# Patient Record
Sex: FEMALE | Race: OTHER | Employment: UNEMPLOYED | URBAN - NONMETROPOLITAN AREA
[De-identification: names, ages, dates, MRNs, and addresses within clinical notes are randomized per-mention and may not be internally consistent; named-entity substitution may affect disease eponyms.]

---
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
## 2024-01-22 ENCOUNTER — OFFICE VISIT (OUTPATIENT)
Dept: FAMILY MEDICINE CLINIC | Age: 19
End: 2024-01-22
Payer: MEDICAID

## 2024-01-22 VITALS
WEIGHT: 120.6 LBS | RESPIRATION RATE: 16 BRPM | SYSTOLIC BLOOD PRESSURE: 100 MMHG | OXYGEN SATURATION: 98 % | TEMPERATURE: 98.4 F | BODY MASS INDEX: 23.68 KG/M2 | HEIGHT: 60 IN | DIASTOLIC BLOOD PRESSURE: 60 MMHG | HEART RATE: 88 BPM

## 2024-01-22 DIAGNOSIS — R40.0 DAYTIME SLEEPINESS: ICD-10-CM

## 2024-01-22 DIAGNOSIS — J35.2 ENLARGED ADENOIDS: ICD-10-CM

## 2024-01-22 DIAGNOSIS — R41.3 MEMORY DIFFICULTY: Primary | ICD-10-CM

## 2024-01-22 PROCEDURE — 99214 OFFICE O/P EST MOD 30 MIN: CPT | Performed by: STUDENT IN AN ORGANIZED HEALTH CARE EDUCATION/TRAINING PROGRAM

## 2024-01-22 ASSESSMENT — PATIENT HEALTH QUESTIONNAIRE - PHQ9
2. FEELING DOWN, DEPRESSED OR HOPELESS: 0
1. LITTLE INTEREST OR PLEASURE IN DOING THINGS: 0
SUM OF ALL RESPONSES TO PHQ QUESTIONS 1-9: 0
SUM OF ALL RESPONSES TO PHQ9 QUESTIONS 1 & 2: 0
SUM OF ALL RESPONSES TO PHQ QUESTIONS 1-9: 0

## 2024-01-22 ASSESSMENT — ENCOUNTER SYMPTOMS
CONSTIPATION: 0
SHORTNESS OF BREATH: 0
COUGH: 0
NAUSEA: 0
DIARRHEA: 0
VOMITING: 0
BACK PAIN: 0
ABDOMINAL PAIN: 0

## 2024-01-22 NOTE — PROGRESS NOTES
Form put into Dr Desir Flax folder to complete S: 18 y.o. female with   Chief Complaint   Patient presents with    Memory Loss     Has been the same since last visit, forgets small things and feels \"scrambled\"       HPI: please see resident note for HPI and ROS.    BP Readings from Last 3 Encounters:   01/22/24 100/60   12/27/23 106/74   11/30/23 110/76     Wt Readings from Last 3 Encounters:   01/22/24 54.7 kg (120 lb 9.6 oz) (35 %, Z= -0.38)*   11/30/23 55.4 kg (122 lb 3.2 oz) (39 %, Z= -0.28)*   10/19/23 56.6 kg (124 lb 12.8 oz) (44 %, Z= -0.14)*     * Growth percentiles are based on [facility] (Girls, 2-20 Years) data.       O: VS:  height is 1.524 m (5') and weight is 54.7 kg (120 lb 9.6 oz). Her oral temperature is 98.4 °F (36.9 °C). Her blood pressure is 100/60 and her pulse is 88. Her respiration is 16 and oxygen saturation is 98%.   AAO/NAD, appropriate affect for mood  CV:  RRR, no murmur  Resp: CTAB     Diagnosis Orders   1. Memory difficulty  [facility] [place]      2. Daytime sleepiness  [facility] [place]      3. Enlarged adenoids  [facility]          Plan:  Please refer to resident note for full plan.    18-year-old female presents the office to follow-up on memory deficit.  Patient had an ASD repaired approximately 9 months ago and since getting out of anesthesia she has had long-term memory deficits resulting in forgetting things, forgetting that she is spending money, messing up on details.  Laboratory evaluation per chart review has been done which was all normal.  Patient was referred to neuropsych testing which is not till September 2024, neurology is in May 2024.  She will plan to follow-up with specialist as scheduled.  MRI was completed and showed enlarged adenoids but otherwise no concerns.  There is concern that patient is probably not sleeping well and possibly snoring with daytime somnolence.  Will plan for sleep study while waiting specialist input.  MoCA was also completed today in the office.  Plan

## 2024-01-22 NOTE — PROGRESS NOTES
Isabella Galeano is a 18 y.o. female who presents today for:  Chief Complaint   Patient presents with    Memory Loss     Has been the same since last visit, forgets small things and feels \"scrambled\"         HPI:   Isabella Galeano is 18 y.o. who presents today for memory follow-up memory difficulties.  Patient states she has had no changes since last visit.  She continues to have difficult time keeping detailed straight when she is having conversations.  She will mixup location, people who are involved in the conversation, and many significant details.  She was previously referred for neuropsych testing, appointment scheduled for 9/2024.  She was referred to neurology, has an appointment with Dr. Croft 5/2024.  Brain MRI was completed, which was overall normal.  It did note mildly enlarged adenoids.  On further discussion, patient states that she never wakes up feeling refreshed in the morning.  She has no known history of snoring or apneic episodes.  Feels that she has some ongoing daytime fatigue, she has with past several years.  She typically gets at least 8 hours of sleep nightly.  She will feel like she needs naps during the day.  This has not changed recently over the past couple months.      Objective:     Vitals:    01/22/24 1314   BP: 100/60   Site: Right Upper Arm   Position: Sitting   Cuff Size: Medium Adult   Pulse: 88   Resp: 16   Temp: 98.4 °F (36.9 °C)   TempSrc: Oral   SpO2: 98%   Weight: 54.7 kg (120 lb 9.6 oz)   Height: 1.524 m (5')       Wt Readings from Last 3 Encounters:   01/22/24 54.7 kg (120 lb 9.6 oz) (35 %, Z= -0.38)*   11/30/23 55.4 kg (122 lb 3.2 oz) (39 %, Z= -0.28)*   10/19/23 56.6 kg (124 lb 12.8 oz) (44 %, Z= -0.14)*     * Growth percentiles are based on CDC (Girls, 2-20 Years) data.       BP Readings from Last 3 Encounters:   01/22/24 100/60   12/27/23 106/74   11/30/23 110/76       Lab Results   Component Value Date    WBC 6.4 10/23/2023    HGB 14.4 10/23/2023    HCT 44.5

## 2024-01-24 ENCOUNTER — OFFICE VISIT (OUTPATIENT)
Dept: PULMONOLOGY | Age: 19
End: 2024-01-24
Payer: MEDICAID

## 2024-01-24 VITALS
DIASTOLIC BLOOD PRESSURE: 70 MMHG | OXYGEN SATURATION: 98 % | SYSTOLIC BLOOD PRESSURE: 110 MMHG | BODY MASS INDEX: 23.75 KG/M2 | TEMPERATURE: 98 F | HEART RATE: 81 BPM | WEIGHT: 121 LBS | HEIGHT: 60 IN

## 2024-01-24 DIAGNOSIS — G47.8 SLEEP TALKING: ICD-10-CM

## 2024-01-24 DIAGNOSIS — J30.9 ALLERGIC RHINITIS, UNSPECIFIED SEASONALITY, UNSPECIFIED TRIGGER: Primary | ICD-10-CM

## 2024-01-24 DIAGNOSIS — F51.3 SLEEP WALKING: ICD-10-CM

## 2024-01-24 PROCEDURE — 99204 OFFICE O/P NEW MOD 45 MIN: CPT | Performed by: INTERNAL MEDICINE

## 2024-01-24 RX ORDER — FLUTICASONE PROPIONATE 50 MCG
1 SPRAY, SUSPENSION (ML) NASAL 2 TIMES DAILY
Qty: 16 G | Refills: 11 | Status: SHIPPED | OUTPATIENT
Start: 2024-01-24

## 2024-01-24 NOTE — PROGRESS NOTES
Chief Complaint: New sleep consult, no prior studies    Mallampati airway Class:3  Neck Circumference:. 13.5Inches    Herkimer sleepiness score 1/24/24: 3  Sleep apnea quality of life questionnaire:.69

## 2024-01-24 NOTE — PATIENT INSTRUCTIONS
Recommendations/Plan:  - Schedule patient for nocturnal polysomnogram (Sleep study) at [facility] sleep lab with pediatric montage along with end tidal Co2 ( Carbon dioxide) monitoring.  Patient also need to be monitored with a parasomnia montage due to her history of sleep talking patient to follow with my clinic at [facility] slee clinic 1week after sleep study.  - Start patient on Flonase 50mcg/INH 2sprays each nostril daily. She  was informed about adverse effects of Flonase. She was demonstrated in my office how to use Flonase. She verbalizes understanding.  -Patient educated about sleep walking disorder and advised to practice necessary safety precautions she need to practice. She verbalizes understanding.  -I had a discussion with patient regarding avialable treatment options for her sleep disorder breathing including but not limited to CPAP titration in the sleep lab Vs.Dental appliance placement with referral to a local dentist Vs other available surgical options including Uvulopalatopharyngoplasty, maxillomandibular ostomy,Inspire device placement and tracheostomy as last option. At the end of discussion, she is not decided on her treatment if she found to have obstructive sleep apnea at this time.  -We will see Isabella Galeano back in 1week after the sleep study to go over the sleep study results and further management options.  -She was educated to practice good sleep hygiene practices. She was provided with a good sleep hygiene hand out.  -Isabella was advised to make earlier appointment with my clinic if she develops any worsening of sleep symptoms. She verbalizes understanding.  -Isabella was advised to not to drive any motor vehicles or operate heavy equipment until her sleep symptoms are under good control.Isabella Galeano verbalizes understanding.  - Isabella Galeano was educated about my impression and plan. She verbalizes understanding.

## 2024-01-24 NOTE — PROGRESS NOTES
[facility]  Sleep Medicine Clinic initial consultation note    Isabella Galeano                                                Chief complaint: Isabella Galeano is a 18 y.o.oldfemale came for further evaluation regarding her decreased memory and ?sleep apnea  with referral from Yumi Ceja MD. she underwent MRI scan of the brain with and without contrast as a part of evaluation for her decreased memory on 26 December 2023.  She was found to have enlarged adenoids on her MRI scan of the brain.  She was sent to my clinic for further evaluation regarding sleep apnea.      [place]:    Sleep/Wake schedule:  Usual time to go to bed during the work/regular day of week: 12 midnight or 1 AM  Usual time to wake up during the work//regular day of week: 10 AM to 11 AM  Over the weekends her sleep schedule:  [x]phase delayed. She feels the same on the weekends despite sleeping long time.  She usually falls a sleep in less than: 10 minutes to 1 hour  She takes naps: No.       Sleep Hygiene:  Is the temperature and evironment in her bed room is acceptable to her: Yes.   She watches Television in her bed room: No.   She read books, study, pay bills etc in the bed: Yes.  She works with her smart phone and read books on her smart phone before going to sleep  Frequency She wake up during night/sleep: 0    Do you drink coffee: No.       Do you drink caffeinated beverages i.e sodas: No.   Do you drink tea: Yes.  She drinks 3 to 4 glasses/mug of tea per week.  Do you drink alcoholic beverages: No.    History of recreational drug use: No    Social History     Tobacco Use    Smoking status: Never     Passive exposure: Never    Smokeless tobacco: Never   Vaping Use    Vaping Use: Never used   Substance Use Topics    Alcohol use: Never    Drug use: Never       Sleep apnea symptoms:  Noticed to have loud snoring:No  Witnessed apneas during sleep noticed: No  History of choking and gasping

## 2024-02-15 ENCOUNTER — TELEPHONE (OUTPATIENT)
Dept: SLEEP CENTER | Age: 19
End: 2024-02-15

## 2024-02-15 NOTE — TELEPHONE ENCOUNTER
Dr. Lopez, please advise.    Thank you,  Missy Haney below has denied:  Patient name: Isabella Galeano   : 2005  DOS :2024  CPT :[id number]  Insurance:   [employer] MEDICAID  Reason for denial:   name & ref# Type of Review 83495-0 - Medical Letter Rationale 89820-5 - You may stop breathing in your sleep (apnea). You snore. You are tired during the day. We do not see that you have a bad heart (Congestive Heart Failure-CHF) or lung (Chronic Obstructive Pulmonary Disease (COPD) problems. We do not see that you had a stroke. We do not see that you have other problems (complex sleep disorder, suspected (eg narcolepsy, cataplexy, periodic limb movement disorder). We do not see that you are on pain medicine (narcotic) that cannot be stopped. We do not see that you cannot do this test at home. You have the right to appeal this decision. Please see enclosed notice of your rights. Please call your doctor with any questions. We made our decision using the guideline: [facility] 27th Edition, Polysomnography (PSG) Sleep Center ACG: A-0145 (AC).     Case/Tracking/Auth/Ref # [id number]  Date of Denial: 2024  P2 Phone#: [phone]   Thanks & Regards   John

## 2024-02-16 DIAGNOSIS — G47.30 SLEEP APNEA, UNSPECIFIED TYPE: Primary | ICD-10-CM

## 2024-02-20 ENCOUNTER — HOSPITAL ENCOUNTER (OUTPATIENT)
Dept: SLEEP CENTER | Age: 19
Discharge: HOME | End: 2024-02-22
Attending: INTERNAL MEDICINE
Payer: MEDICAID

## 2024-02-20 DIAGNOSIS — G47.30 SLEEP APNEA, UNSPECIFIED TYPE: ICD-10-CM

## 2024-02-20 PROCEDURE — 95806 SLEEP STUDY UNATT&RESP EFFT: CPT

## 2024-02-20 NOTE — PROGRESS NOTES
Isabella presents today for a HST instruction and demonstration on unit # [id number]. Questions were asked and answers given.  She was able to return demonstration and verbalized understanding.  The sleep center control room phone number was provided in case questions arise during the study. Informed patient to call 911 in case of an emergency.   She states she will return the unit tomorrow before 1000.                       Title:  Home Sleep Apnea Testing (HSAT)     Approved by:  Suhail Lopez MD        Approval Date: December, 2021  Next Review: December, 2023       Responsible Party:  Inessa Portillo  Institution/Entities Applies to:    [facility]    Policy Number:  None      Document Type:  Such as Guideline, Policy,   Policy & Procedure, or Procedure, Instructions   Manual:  Policy and Procedures     Section: IV  Policy Start Date: June, 2011       HOME SLEEP APNEA TESTING (HSAT)      PURPOSE: To ensure home sleep apnea testing (HSAT) conducted by the sleep facility adheres to the current AASM practice parameters, clinical practice guidelines, best practice and clinical guidelines in regard to the diagnosis of MARCELLE in adults.       POLICY:      HSAT is a method of recording certain parameters which will target and measure, minimally, heart rate, oxygen saturation, respiratory airflow, respiratory effort and snoring for the purpose of evaluating a patient for MARCELLE.       HSAT will be performed in conjunction with a comprehensive sleep evaluation by an appropriately licensed sleep facility medical staff member. All portable monitoring equipment will be FDA-approved and appropriately maintained to ensure patient safety and efficiency of the test.       PROCEDURE:      An order from a licensed sleep physician along with appropriate medical history documenting the indication for HSAT that complies with the AASM practice parameters.    All tests will be performed, and records

## 2024-03-07 ENCOUNTER — OFFICE VISIT (OUTPATIENT)
Dept: PULMONOLOGY | Age: 19
End: 2024-03-07
Payer: MEDICAID

## 2024-03-07 VITALS
HEART RATE: 94 BPM | WEIGHT: 121.2 LBS | DIASTOLIC BLOOD PRESSURE: 82 MMHG | BODY MASS INDEX: 23.8 KG/M2 | SYSTOLIC BLOOD PRESSURE: 102 MMHG | HEIGHT: 60 IN | TEMPERATURE: 97.7 F | OXYGEN SATURATION: 98 %

## 2024-03-07 DIAGNOSIS — G47.19 EXCESSIVE DAYTIME SLEEPINESS: Primary | ICD-10-CM

## 2024-03-07 PROBLEM — Q21.10 ATRIAL SEPTAL DEFECT: Status: ACTIVE | Noted: 2022-06-15

## 2024-03-07 PROCEDURE — 99213 OFFICE O/P EST LOW 20 MIN: CPT | Performed by: NURSE PRACTITIONER

## 2024-03-07 NOTE — PROGRESS NOTES
[place]    Isabella Galeano, 18 y.o.  [id number]    Nurses Notes   Psg f/u   Ess : 7   saqli :87  Study Results  Initial Study Date -  2.20.24  AHI -  1.3    Scored at 3%   Total Events - 11  (Apneas  1  Hypopneas 10  Central  0)  (Total Sleep Time - 5.7.5 min)  Time with Sats below 88% - 1.0 min  Interval History       Isabella Galeano is a 18 y.o. old femalewho comes in to review the results of her recent sleep study, to answer questions and to explore options for treatment. Pt reports she feels sleepy all the time and has been having memory issues.   Is in process of getting consults with  neurology and psychology . In the meantime PCP recommended a sleep study   She completed HST     Meds  Current Outpatient Medications   Medication Sig Dispense Refill    fluticasone (FLONASE) 50 MCG/ACT nasal spray 1 spray by Each Nostril route in the morning and at bedtime 16 g 11    aspirin 325 MG tablet every 6 hours as needed      ibuprofen (ADVIL;MOTRIN) 200 MG tablet Take 1-2 tablets by mouth as needed for Pain      Aspirin-Acetaminophen-Caffeine (EXCEDRIN EXTRA STRENGTH PO) Take by mouth as needed       No current facility-administered medications for this visit.     ROS  Review of Systems   Constitutional:  Positive for fatigue.   Psychiatric/Behavioral:  Positive for decreased concentration and dysphoric mood.         Memory issues    All other systems reviewed and are negative.     Exam  /82 (Site: Right Upper Arm, Position: Sitting, Cuff Size: Medium Adult)   Pulse 94   Temp 97.7 °F (36.5 °C) (Infrared)   Ht 1.524 m (5')   Wt 55 kg (121 lb 3.2 oz)   SpO2 98%   BMI 23.67 kg/m²    Body mass index is 23.67 kg/m².  Oxygen level - Room Air  Physical Exam  Vitals and nursing note reviewed.   Constitutional:       Appearance: Normal appearance. She is normal weight.   HENT:      Head: Normocephalic and atraumatic.      Mouth/Throat:      Pharynx: Oropharynx is

## 2024-05-09 ENCOUNTER — OFFICE VISIT (OUTPATIENT)
Dept: FAMILY MEDICINE CLINIC | Age: 19
End: 2024-05-09
Payer: MEDICAID

## 2024-05-09 VITALS
OXYGEN SATURATION: 99 % | HEART RATE: 77 BPM | DIASTOLIC BLOOD PRESSURE: 70 MMHG | BODY MASS INDEX: 22.89 KG/M2 | SYSTOLIC BLOOD PRESSURE: 102 MMHG | RESPIRATION RATE: 12 BRPM | HEIGHT: 60 IN | TEMPERATURE: 97.6 F | WEIGHT: 116.6 LBS

## 2024-05-09 DIAGNOSIS — R41.3 MEMORY DIFFICULTY: Primary | ICD-10-CM

## 2024-05-09 PROCEDURE — 99213 OFFICE O/P EST LOW 20 MIN: CPT | Performed by: STUDENT IN AN ORGANIZED HEALTH CARE EDUCATION/TRAINING PROGRAM

## 2024-05-09 ASSESSMENT — ENCOUNTER SYMPTOMS
ABDOMINAL PAIN: 0
VOMITING: 0
NAUSEA: 0
DIARRHEA: 0
CONSTIPATION: 0

## 2024-05-09 NOTE — PROGRESS NOTES
S: 19 y.o. female with   Chief Complaint   Patient presents with    Follow-up     4 month       HPI: please see resident note for HPI and ROS.    BP Readings from Last 3 Encounters:   05/09/24 102/70   03/07/24 102/82   01/24/24 110/70     Wt Readings from Last 3 Encounters:   05/09/24 52.9 kg (116 lb 9.6 oz)   03/07/24 55 kg (121 lb 3.2 oz) (36 %, Z= -0.36)*   01/24/24 54.9 kg (121 lb) (36 %, Z= -0.36)*     * Growth percentiles are based on CDC (Girls, 2-20 Years) data.       O: VS:  height is 1.524 m (5') and weight is 52.9 kg (116 lb 9.6 oz). Her temporal temperature is 97.6 °F (36.4 °C). Her blood pressure is 102/70 and her pulse is 77. Her respiration is 12 and oxygen saturation is 99%.        Diagnosis Orders   1. Memory difficulty            Plan:  Please refer to resident note for full plan.    19-year-old female here for follow up. History of memory issues; appt with neuropsych in 8/2024. Lab work up has been negative. Sleep study was unremarkable. Patient feels symptoms may be improving some. Advised to follow up with neuropsychiatry as scheduled. Follows with cardiology for history of ASD s/p closure. Follow up in 4 months or sooner if needed.     Health Maintenance Due   Topic Date Due    COVID-19 Vaccine (1) Never done    Varicella vaccine (2 of 2 - 2-dose childhood series) 11/19/2008    HPV vaccine (1 - 2-dose series) Never done       Attending Physician Statement  I have discussed the case, including pertinent history and exam findings with the resident.  I agree with the documented assessment and plan as documented by the resident.        Michell Edouard, DO 5/11/2024 10:15 AM

## 2024-05-09 NOTE — PROGRESS NOTES
Health Maintenance Due   Topic Date Due    COVID-19 Vaccine (1) Never done    Varicella vaccine (2 of 2 - 2-dose childhood series) 11/19/2008    HPV vaccine (1 - 2-dose series) Never done

## 2024-05-09 NOTE — PROGRESS NOTES
Isabella Galeano is a 19 y.o. female who presents today for:  Chief Complaint   Patient presents with    Follow-up     4 month         HPI:   Isabella Galeano is 19 y.o. who presents today for follow-up.    Patient presents for follow-up of memory difficulty and excessive daytime sleepiness.  Since last visit, she has seen sleep medicine, without any findings of abnormal sleep disorders.  They recommend following up as needed and continuing sleep hygiene.  Patient states since last visit her memory she feels like has improved slightly.  She has not had any further major lapses in memory or spending money which she forgot about.  She does have appointment for neuropsych testing 8/2024.  She will also follow-up with cardiology for history of ASD 5/20/2024.  Overall, patient denies any new concerns at this time.    Objective:     Vitals:    05/09/24 1259   BP: 102/70   Site: Left Upper Arm   Position: Sitting   Cuff Size: Medium Adult   Pulse: 77   Resp: 12   Temp: 97.6 °F (36.4 °C)   TempSrc: Temporal   SpO2: 99%   Weight: 52.9 kg (116 lb 9.6 oz)   Height: 1.524 m (5')       Wt Readings from Last 3 Encounters:   05/09/24 52.9 kg (116 lb 9.6 oz)   03/07/24 55 kg (121 lb 3.2 oz) (36 %, Z= -0.36)*   01/24/24 54.9 kg (121 lb) (36 %, Z= -0.36)*     * Growth percentiles are based on CDC (Girls, 2-20 Years) data.       BP Readings from Last 3 Encounters:   05/09/24 102/70   03/07/24 102/82   01/24/24 110/70       Lab Results   Component Value Date    WBC 6.4 10/23/2023    HGB 14.4 10/23/2023    HCT 44.5 10/23/2023    MCV 90.4 10/23/2023     10/23/2023     Lab Results   Component Value Date     10/23/2023    K 4.1 10/23/2023     10/23/2023    CO2 24 10/23/2023    BUN 13 10/23/2023    CREATININE 0.7 10/23/2023    GLUCOSE 90 10/23/2023    CALCIUM 9.4 10/23/2023    BILITOT 0.9 10/23/2023    ALKPHOS 93 10/23/2023    AST 15 10/23/2023    ALT 10 (L) 10/23/2023    LABGLOM >60 10/23/2023     Lab Results

## 2024-06-17 ENCOUNTER — HOSPITAL ENCOUNTER (OUTPATIENT)
Dept: PEDIATRICS | Age: 19
Discharge: HOME | End: 2024-06-17
Payer: MEDICAID

## 2024-06-17 VITALS
SYSTOLIC BLOOD PRESSURE: 111 MMHG | DIASTOLIC BLOOD PRESSURE: 68 MMHG | RESPIRATION RATE: 16 BRPM | HEART RATE: 79 BPM | OXYGEN SATURATION: 98 % | TEMPERATURE: 98 F

## 2024-06-17 LAB
EKG ATRIAL RATE: 78 BPM
EKG P AXIS: 52 DEGREES
EKG P-R INTERVAL: 166 MS
EKG Q-T INTERVAL: 380 MS
EKG QRS DURATION: 88 MS
EKG QTC CALCULATION (BAZETT): 433 MS
EKG R AXIS: 146 DEGREES
EKG T AXIS: 71 DEGREES
EKG VENTRICULAR RATE: 78 BPM

## 2024-06-17 PROCEDURE — 93005 ELECTROCARDIOGRAM TRACING: CPT | Performed by: PEDIATRICS

## 2024-06-17 PROCEDURE — 99212 OFFICE O/P EST SF 10 MIN: CPT

## 2024-06-17 RX ORDER — METOPROLOL SUCCINATE 50 MG/1
50 TABLET, EXTENDED RELEASE ORAL DAILY
COMMUNITY

## 2024-06-17 NOTE — DISCHARGE INSTRUCTIONS
Isabella was seen today in cardiology clinic for follow up of her large atrial septal defect s/p surgical repair and SVT, which was previously well controlled on Metoprolol.  Given the return of palpitations and high heart rates documented on her apple watch, as well as her prior history of SVT we will plan to restart the Metoprolol.  Will restart Metoprolol 50mg extended release each day as previously this dose had provided good rhythm control.   Plan to follow up in 1 month with an echocardiogram and to reassess symptoms while back on Metoprolol.  If symptoms persist, will repeat a heart rhythm monitor.  No cardiac restrictions or precautions are needed at this time. Please do not hesitate to call with any questions or concerns. [phone]

## 2024-06-17 NOTE — PROGRESS NOTES
recorded on 4/18/2022.  BMI PERCENTILE 51 %ile (Z= 0.03) based on CDC (Girls, 2-20 Years) BMI-for-age based on BMI available as of 4/18/2022.  General: NAD, non-syndromic, acyanotic  HEENT: MMM, nares patent  Resp: Normal work of breathing.  CTAB with good aeration and respiratory effort.  CV: RRR, no murmur no rub or gallop. Normal PMI.  Brachial/Femoral pulses were equal and without delay.  Cap refil <3 sec  GI: Abdomen soft, NT/ND.  No hepatomegaly  MSK: Normal age appropriate movements of all extremities. No clubbing.  Neuro: Age appropriate normal neuro status        DATA:    EKG: NSR with low voltages.  RAD with possible RVH.  Abnormal EKG        I also reviewed the following studies which were previously obtained:     Echocardiogram: History of ASD s/p repair with no residual defect.  Right heart chamber sizes have normalized. Normal RV function.  Normal LV size and function.       CMRI:  Severe right heart dilation with Qp:Qs of 2.8 to 1.  Normal RV function and pressures.  Normal pulmonary venous return.     IMPRESSION/RECOMMENDATIONS:     Isabella was seen today in cardiology clinic for follow up of her large atrial septal defect s/p surgical repair and SVT, which was previously well controlled on Metoprolol.  Given the return of palpitations and high heart rates documented on her apple watch, as well as her prior history of SVT we will plan to restart the Metoprolol.  Will restart Metoprolol 50mg extended release each day as previously this dose had provided good rhythm control.   Plan to follow up in 1 month with an echocardiogram and to reassess symptoms while back on Metoprolol.  If symptoms persist, will repeat a heart rhythm monitor.  No cardiac restrictions or precautions are needed at this time. Please do not hesitate to call with any questions or concerns. [phone]

## 2024-07-03 ENCOUNTER — TELEPHONE (OUTPATIENT)
Dept: PEDIATRICS | Age: 19
End: 2024-07-03

## 2024-07-03 NOTE — TELEPHONE ENCOUNTER
I attempted to call the patient and her parents, that were listed as contacts for her, to verify insurance, in light of her upcoming appointment on 7/15/24 with scheduled Echocardiogram. The patient did not answer, it went right to voicemail, and I left a voicemail for her to call us. I attempted to call the patient's Mother and she did not answer, unable to leave a message. I called the patient's Father and he did not answer, I also left him a voicemail asking him to have the patient call us.

## 2024-07-03 NOTE — TELEPHONE ENCOUNTER
I reached out to [place] medicaid and left a message. We had started a prior auth for an ECHO/[id number] and was trying to determine if it was approved. We received a phone message back from Howie with [employer] to say that their insurance was termed on 4/1/2024. I then spoke with Sagrario Melo our Administative Assistant who checked [facility]'s insurance verification programs. She checked [employer] which showed no coverage, [employer] which showed coverage and [employer] which showed no coverage. Sagrario then reached out to [facility]'s director of insurance Chiara Slaughter, by email without any response. Sagrario reached out to Chiara again today and had to leave a voice message.

## 2024-07-09 NOTE — TELEPHONE ENCOUNTER
[employer] called back asking that we send a new PA request form with records and said that we can chino it urgent, so that the process will take 2-3 days, in time for 7/15/24 clinic. I faxed a new pre-certification form with records and it was marked urgent. Patient's appointment was moved to 7/15/24 at 11:00 AM as originally scheduled and patient was notified.

## 2024-07-09 NOTE — TELEPHONE ENCOUNTER
I called [employer] and left a voicemail on their nurse's line. I informed them that we were informed by the patient and verified with insurance that the patient has insurance as of 5/1/24 and I asked them if we need to start the prior authorization process for the Echocardiogram again or what is our next step in prior authorizing her Echo. I also informed them that we would like to have this process done prior to Dr. Posadas's next clinic on 7/15/24.

## 2024-07-09 NOTE — TELEPHONE ENCOUNTER
Per Sagrario Melo, 7/9/24 pt called on insurance -advised no coverage and was error last month on eligible as told term 3/31/24. She states she reapplied but thought all was good and her mom takes care of all this. Advised for her to talk to mom asap and  and call us back by end of week w/status for precert takes 2 wks. She req to r/s to aug - blm  7/9/24 pt phoned back stating she spoke to medicaid/anthem and was approved eff 5/1/24. I verified thru [employer]. Advised we would try to get precert done for July and if able will contact her to move appt back to next week - she agreed/blm

## 2024-07-15 ENCOUNTER — HOSPITAL ENCOUNTER (OUTPATIENT)
Dept: PEDIATRICS | Age: 19
Discharge: HOME | End: 2024-07-15
Payer: MEDICAID

## 2024-07-15 ENCOUNTER — HOSPITAL ENCOUNTER (OUTPATIENT)
Age: 19
Discharge: HOME | End: 2024-07-17
Attending: PEDIATRICS
Payer: MEDICAID

## 2024-07-15 ENCOUNTER — HOSPITAL ENCOUNTER (OUTPATIENT)
Age: 19
Discharge: HOME | End: 2024-07-15
Payer: MEDICAID

## 2024-07-15 VITALS
DIASTOLIC BLOOD PRESSURE: 59 MMHG | HEIGHT: 60 IN | WEIGHT: 117.2 LBS | OXYGEN SATURATION: 96 % | BODY MASS INDEX: 23.01 KG/M2 | SYSTOLIC BLOOD PRESSURE: 97 MMHG | HEART RATE: 75 BPM | RESPIRATION RATE: 16 BRPM | TEMPERATURE: 98 F

## 2024-07-15 DIAGNOSIS — I47.10 SVT (SUPRAVENTRICULAR TACHYCARDIA) (HCC): ICD-10-CM

## 2024-07-15 DIAGNOSIS — Z87.74 HISTORY OF REPAIR OF CONGENITAL ATRIAL SEPTAL DEFECT (ASD): ICD-10-CM

## 2024-07-15 DIAGNOSIS — Z87.74 HISTORY OF REPAIR OF CONGENITAL ATRIAL SEPTAL DEFECT (ASD): Primary | ICD-10-CM

## 2024-07-15 LAB
ECHO AO ASC DIAM: 2.8 CM
ECHO AO ASCENDING AORTA INDEX: 1.88 CM/M2
ECHO AO ROOT DIAM: 2.4 CM
ECHO AO ROOT INDEX: 1.61 CM/M2
ECHO AO ST JNCT DIAM: 2.5 CM
ECHO AV AREA PEAK VELOCITY: 2.2 CM2
ECHO AV AREA/BSA PEAK VELOCITY: 1.5 CM2/M2
ECHO AV PEAK GRADIENT: 4 MMHG
ECHO AV PEAK VELOCITY: 1 M/S
ECHO AV VELOCITY RATIO: 0.9
ECHO BSA: 1.5 M2
ECHO BSA: 1.5 M2
ECHO LV E' LATERAL VELOCITY: 13 CM/S
ECHO LV E' SEPTAL VELOCITY: 12 CM/S
ECHO LV INTERNAL DIMENSION DIASTOLIC MMODE: 4.5 CM (ref 3.9–5.3)
ECHO LV INTERNAL DIMENSION SYSTOLIC MMODE: 3 CM
ECHO LV IVSD MMODE: 0.8 CM (ref 0.6–0.9)
ECHO LV POSTERIOR WALL DIASTOLIC MMODE: 0.7 CM (ref 0.6–0.9)
ECHO LVOT AREA: 2.5 CM2
ECHO LVOT DIAM: 1.8 CM
ECHO LVOT PEAK GRADIENT: 3 MMHG
ECHO LVOT PEAK VELOCITY: 0.9 M/S
ECHO MV A VELOCITY: 0.54 M/S
ECHO MV E VELOCITY: 0.65 M/S
ECHO MV E/A RATIO: 1.2
ECHO MV E/E' LATERAL: 5
ECHO MV E/E' RATIO (AVERAGED): 5.21
ECHO MV E/E' SEPTAL: 5.42
ECHO TV E WAVE: 0.5 M/S
FOLATE SERPL-MCNC: 9.8 NG/ML (ref 4.8–24.2)
TSH SERPL DL<=0.005 MIU/L-ACNC: 1.55 UIU/ML (ref 0.4–4.2)
VIT B12 SERPL-MCNC: 685 PG/ML (ref 211–911)

## 2024-07-15 PROCEDURE — 36415 COLL VENOUS BLD VENIPUNCTURE: CPT

## 2024-07-15 PROCEDURE — 84443 ASSAY THYROID STIM HORMONE: CPT

## 2024-07-15 PROCEDURE — 84207 ASSAY OF VITAMIN B-6: CPT

## 2024-07-15 PROCEDURE — 93242 EXT ECG>48HR<7D RECORDING: CPT

## 2024-07-15 PROCEDURE — 82607 VITAMIN B-12: CPT

## 2024-07-15 PROCEDURE — 93325 DOPPLER ECHO COLOR FLOW MAPG: CPT

## 2024-07-15 PROCEDURE — 99212 OFFICE O/P EST SF 10 MIN: CPT

## 2024-07-15 PROCEDURE — 82746 ASSAY OF FOLIC ACID SERUM: CPT

## 2024-07-15 PROCEDURE — 84425 ASSAY OF VITAMIN B-1: CPT

## 2024-07-15 NOTE — PROGRESS NOTES
Department of Pediatrics  Outpatient cardiology clinic at [facility]           CHIEF COMPLAINT:  ASD follow up and atrial tachycardia     History Obtained From:  patient, mother     HISTORY OF PRESENT ILLNESS:             Isabella is a 19 y.o. female with history of Multiple hereditary osteochondromas, s/p multiple bone tumor resections, who presents today for follow up evaluation of large atrial septal defect now s/p surgical repair and SVT well controlled on Metoprolol.      Isabella was first evaluated in cardiology clinic in Oct 2021, at which time she reported palpitations that occur randomly and last for about a minute.  She thinks that they are becoming more frequent.  They occur about equally at night and during the day.      During that visit she was noted to have a markedly abnormal EKG, so an echocardiogram was completed and showed an aneurysmal atrial septum and concern for significant right heart dilation.  The atrial septum was not well visualized, but there appeared to be an ASD.  We asked for Isabella to obtain an cMRI  and EST.  Her EST was unremarkable, but MRI confirmed a large ASD with 2.8:1 Qp: Qs.  Repeat echocardiogram showed a large atrial septal defect with deficient pulmonary venous and aortic rims. Holter monitor also revealed SVT.       Isabella initially went for an attempt at ablation, which was unsuccessful.  She has subsequently been maintained on Metrolol XL.  She underwent successful surgical ASD closure with Dr. Gonzalez at [facility] in September 2022.  There were no aki or post operative concerns.  As of our last follow up appointment, her Right heart chamber sizes have returned to normal. WE discontinued the Metoprolol in October 2023 as we were optimistic that upon normalization of right atrial size, the arrhythmias may resolved.      She was seen in clinic last month.  At that time she reported that she often feels fatigued and has episodes of racing heart rates. Sometimes as fast as 150s-160s

## 2024-07-15 NOTE — PROGRESS NOTES
We received a phone call from Kayli at [employer] who stated that the prior authorization request for an Echocardiogram was approved. They faxed an Authorization letter, approval #[id number].

## 2024-07-15 NOTE — DISCHARGE INSTRUCTIONS
Isabella was seen today in cardiology clinic for follow up of her large atrial septal defect s/p surgical repair and SVT, which was previously well controlled on Metoprolol. Today's echocardiogram looked great-- no residual ASD, normal right heart chamber sizes, pressures and function. No concerns.  Since symptoms persist, though they are notably better on the Metoprolol, we will repeat a heart rhythm monitor.  We will call with results and decide if a Medication dosage adjustment is needed.  In the meanwhile, continue Metoprolol 50mg XR.  No cardiac restrictions or precautions are needed at this time. Please do not hesitate to call with any questions or concerns. [phone]

## 2024-07-18 LAB — PYRIDOXAL PHOS SERPL-SCNC: 41.2 NMOL/L (ref 20–125)

## 2024-07-20 LAB — VIT B1 PYROPHOSHATE BLD-SCNC: 139 NMOL/L (ref 70–180)

## 2024-08-10 LAB — ECHO BSA: 1.5 M2

## 2024-08-20 ENCOUNTER — OFFICE VISIT (OUTPATIENT)
Dept: PSYCHOLOGY | Age: 19
End: 2024-08-20

## 2024-08-20 DIAGNOSIS — F43.22 ADJUSTMENT DISORDER WITH ANXIETY: Primary | ICD-10-CM

## 2024-08-20 ASSESSMENT — PATIENT HEALTH QUESTIONNAIRE - PHQ9
SUM OF ALL RESPONSES TO PHQ QUESTIONS 1-9: 1
SUM OF ALL RESPONSES TO PHQ QUESTIONS 1-9: 1
1. LITTLE INTEREST OR PLEASURE IN DOING THINGS: NOT AT ALL
2. FEELING DOWN, DEPRESSED OR HOPELESS: SEVERAL DAYS
SUM OF ALL RESPONSES TO PHQ QUESTIONS 1-9: 1
SUM OF ALL RESPONSES TO PHQ QUESTIONS 1-9: 1
SUM OF ALL RESPONSES TO PHQ9 QUESTIONS 1 & 2: 1

## 2024-08-20 ASSESSMENT — ANXIETY QUESTIONNAIRES
1. FEELING NERVOUS, ANXIOUS, OR ON EDGE: SEVERAL DAYS
GAD7 TOTAL SCORE: 3
7. FEELING AFRAID AS IF SOMETHING AWFUL MIGHT HAPPEN: SEVERAL DAYS
2. NOT BEING ABLE TO STOP OR CONTROL WORRYING: NOT AT ALL
5. BEING SO RESTLESS THAT IT IS HARD TO SIT STILL: NOT AT ALL
IF YOU CHECKED OFF ANY PROBLEMS ON THIS QUESTIONNAIRE, HOW DIFFICULT HAVE THESE PROBLEMS MADE IT FOR YOU TO DO YOUR WORK, TAKE CARE OF THINGS AT HOME, OR GET ALONG WITH OTHER PEOPLE: NOT DIFFICULT AT ALL
4. TROUBLE RELAXING: NOT AT ALL
3. WORRYING TOO MUCH ABOUT DIFFERENT THINGS: NOT AT ALL
6. BECOMING EASILY ANNOYED OR IRRITABLE: SEVERAL DAYS

## 2024-08-20 NOTE — PROGRESS NOTES
ASD.  She said they tried several procedures but then ended up with open heart surgery.  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  She has had several procedures to have these removed.  She stated that her mother has the same disorder and so to her sisters.  She said her next oldest sister developed cancer and went through treatment.  The patient stated that she was very stressed out during this time and this is the time when she was also having more memory problems than she has had recently.  She said things have gotten better.  Her sister is doing much better.  She states that she has ongoing heart palpitations.  Her last exam was in November 2024.    She states that she sleeps between 8 and 12 hours a night.  She has problems falling asleep at night so she ends up sleeping the next day.  She has been trying to get her sleep patterns regulated.  Her job requires her to keep track of her time because she decides when she is going to work.  However, she many times will forget the exact time that she worked.  She described her appetite as average.  She is taking metoprolol, Flonase, Advil, Motrin for pain and also aspirin-acetaminophen-caffeine as needed.    On her thoughts and behaviors checklist she identified these issues as occurring sometimes: Most people do not like me, and I am going crazy.  She identified these issues as occurring rarely: Life is hopeless, I am lonely, I want to die, I am so stupid, and I am so depressed.  She identified these issues as occurring never: No one cares about me, I am a failure, I want to hurt someone, I cannot concentrate, God is disappointed in me, I cannot be forgiven, why my so different, I cannot do anything right, people here are my thoughts, I have no emotions, someone is watching me, I hear voices in my head, and I am out of control.  She stated that she feels she is going crazy because she just has memory issues at times.    She graduated

## 2024-09-17 ENCOUNTER — OFFICE VISIT (OUTPATIENT)
Dept: PSYCHOLOGY | Age: 19
End: 2024-09-17
Payer: MEDICAID

## 2024-09-17 DIAGNOSIS — F43.22 ADJUSTMENT DISORDER WITH ANXIETY: Primary | ICD-10-CM

## 2024-09-17 PROCEDURE — 90832 PSYTX W PT 30 MINUTES: CPT | Performed by: PSYCHOLOGIST

## 2024-09-17 ASSESSMENT — ANXIETY QUESTIONNAIRES
GAD7 TOTAL SCORE: 5
IF YOU CHECKED OFF ANY PROBLEMS ON THIS QUESTIONNAIRE, HOW DIFFICULT HAVE THESE PROBLEMS MADE IT FOR YOU TO DO YOUR WORK, TAKE CARE OF THINGS AT HOME, OR GET ALONG WITH OTHER PEOPLE: NOT DIFFICULT AT ALL
7. FEELING AFRAID AS IF SOMETHING AWFUL MIGHT HAPPEN: SEVERAL DAYS
3. WORRYING TOO MUCH ABOUT DIFFERENT THINGS: SEVERAL DAYS
2. NOT BEING ABLE TO STOP OR CONTROL WORRYING: SEVERAL DAYS
5. BEING SO RESTLESS THAT IT IS HARD TO SIT STILL: NOT AT ALL
6. BECOMING EASILY ANNOYED OR IRRITABLE: MORE THAN HALF THE DAYS
4. TROUBLE RELAXING: NOT AT ALL
1. FEELING NERVOUS, ANXIOUS, OR ON EDGE: NOT AT ALL

## 2024-09-17 ASSESSMENT — PATIENT HEALTH QUESTIONNAIRE - PHQ9
3. TROUBLE FALLING OR STAYING ASLEEP: MORE THAN HALF THE DAYS
SUM OF ALL RESPONSES TO PHQ QUESTIONS 1-9: 7
SUM OF ALL RESPONSES TO PHQ QUESTIONS 1-9: 7
SUM OF ALL RESPONSES TO PHQ9 QUESTIONS 1 & 2: 3
4. FEELING TIRED OR HAVING LITTLE ENERGY: MORE THAN HALF THE DAYS
2. FEELING DOWN, DEPRESSED OR HOPELESS: SEVERAL DAYS
6. FEELING BAD ABOUT YOURSELF - OR THAT YOU ARE A FAILURE OR HAVE LET YOURSELF OR YOUR FAMILY DOWN: NOT AT ALL
5. POOR APPETITE OR OVEREATING: NOT AT ALL
1. LITTLE INTEREST OR PLEASURE IN DOING THINGS: MORE THAN HALF THE DAYS
8. MOVING OR SPEAKING SO SLOWLY THAT OTHER PEOPLE COULD HAVE NOTICED. OR THE OPPOSITE, BEING SO FIGETY OR RESTLESS THAT YOU HAVE BEEN MOVING AROUND A LOT MORE THAN USUAL: NOT AT ALL
7. TROUBLE CONCENTRATING ON THINGS, SUCH AS READING THE NEWSPAPER OR WATCHING TELEVISION: NOT AT ALL
10. IF YOU CHECKED OFF ANY PROBLEMS, HOW DIFFICULT HAVE THESE PROBLEMS MADE IT FOR YOU TO DO YOUR WORK, TAKE CARE OF THINGS AT HOME, OR GET ALONG WITH OTHER PEOPLE: SOMEWHAT DIFFICULT
SUM OF ALL RESPONSES TO PHQ QUESTIONS 1-9: 7
9. THOUGHTS THAT YOU WOULD BE BETTER OFF DEAD, OR OF HURTING YOURSELF: NOT AT ALL
SUM OF ALL RESPONSES TO PHQ QUESTIONS 1-9: 7

## 2024-11-12 ENCOUNTER — HOSPITAL ENCOUNTER (EMERGENCY)
Age: 19
Discharge: HOME | End: 2024-11-12
Payer: MEDICAID

## 2024-11-12 VITALS
RESPIRATION RATE: 16 BRPM | SYSTOLIC BLOOD PRESSURE: 96 MMHG | HEIGHT: 60 IN | DIASTOLIC BLOOD PRESSURE: 64 MMHG | TEMPERATURE: 97.8 F | BODY MASS INDEX: 23.56 KG/M2 | HEART RATE: 68 BPM | WEIGHT: 120 LBS | OXYGEN SATURATION: 97 %

## 2024-11-12 DIAGNOSIS — H01.114 ALLERGIC DERMATITIS OF LEFT UPPER EYELID: Primary | ICD-10-CM

## 2024-11-12 PROCEDURE — 99213 OFFICE O/P EST LOW 20 MIN: CPT | Performed by: NURSE PRACTITIONER

## 2024-11-12 PROCEDURE — 99213 OFFICE O/P EST LOW 20 MIN: CPT

## 2024-11-12 RX ORDER — PREDNISONE 20 MG/1
20 TABLET ORAL 2 TIMES DAILY
Qty: 10 TABLET | Refills: 0 | Status: SHIPPED | OUTPATIENT
Start: 2024-11-12 | End: 2024-11-17

## 2024-11-12 RX ORDER — DIPHENHYDRAMINE HCL 25 MG
25 CAPSULE ORAL EVERY 6 HOURS PRN
COMMUNITY
Start: 2024-11-12 | End: 2024-11-22

## 2024-11-12 ASSESSMENT — ENCOUNTER SYMPTOMS
CHEST TIGHTNESS: 0
BLIND SPOTS: 0
EYE INFLAMMATION: 0
EYE WATERING: 0
CHOKING: 0
EYE ITCHING: 0
WHEEZING: 0
COUGH: 0
DOUBLE VISION: 0
STRIDOR: 0
NAUSEA: 0
EYE PAIN: 0
SHORTNESS OF BREATH: 0
EYE DISCHARGE: 0
ABDOMINAL PAIN: 0
EYE REDNESS: 0
CRUSTING: 0
APNEA: 0
COLOR CHANGE: 1
VOMITING: 0
BLURRED VISION: 0
PERI-ORBITAL EDEMA: 0
PHOTOPHOBIA: 0

## 2024-11-12 ASSESSMENT — PAIN - FUNCTIONAL ASSESSMENT: PAIN_FUNCTIONAL_ASSESSMENT: 0-10

## 2024-11-12 ASSESSMENT — PAIN DESCRIPTION - ORIENTATION: ORIENTATION: LEFT

## 2024-11-12 ASSESSMENT — PAIN DESCRIPTION - PAIN TYPE: TYPE: ACUTE PAIN

## 2024-11-12 ASSESSMENT — VISUAL ACUITY: OU: 1

## 2024-11-12 ASSESSMENT — PAIN SCALES - GENERAL: PAINLEVEL_OUTOF10: 2

## 2024-11-12 ASSESSMENT — PAIN DESCRIPTION - DESCRIPTORS: DESCRIPTORS: BURNING

## 2024-11-12 NOTE — ED PROVIDER NOTES
[facility]ENT [facility]  Urgent Care Encounter      CHIEF COMPLAINT       Chief Complaint   Patient presents with    Eye Problem     Pt c/o bilateral eye redness, itchiness and slight swelling intermittent with last flare up x 5 days       Nurses Notes reviewed and I agree except as noted in the HPI.  HISTORY OFPRESENT ILLNESS   Isabella Galeano is a 19 y.o.  The history is provided by the patient. No  was used.   Eye Problem  Location:  Left eye  Quality: dry skin iwth discoloration at times.  Severity:  Moderate  Onset quality:  Gradual  Duration:  2 months  Timing:  Intermittent  Progression:  Waxing and waning  Chronicity:  New  Context: not burn, not chemical exposure, not contact lens problem, not direct trauma, not foreign body, not using machinery, not scratch, not smoke exposure and not UV exposure    Relieved by:  Nothing  Worsened by:  Contact (make up or moisturizer)  Ineffective treatments:  Commercial eye wash  Associated symptoms: no blurred vision, no crusting, no decreased vision, no discharge, no double vision, no facial rash, no headaches, no inflammation, no itching, no nausea, no numbness, no photophobia, no redness, no scotomas, no swelling, no tearing, no tingling, no vomiting and no weakness    Risk factors: no conjunctival hemorrhage, no exposure to pinkeye, no previous injury to eye, no recent herpes zoster and no recent URI        REVIEW OF SYSTEMS     Review of Systems   Constitutional:  Negative for activity change, appetite change, chills, diaphoresis, fatigue, fever and unexpected weight change.   Eyes:  Negative for blurred vision, double vision, photophobia, pain, discharge, redness, itching and visual disturbance.   Respiratory:  Negative for apnea, cough, choking, chest tightness, shortness of breath, wheezing and stridor.    Cardiovascular:  Negative for chest pain, palpitations and leg swelling.   Gastrointestinal:  Negative for abdominal pain,

## 2024-11-12 NOTE — ED TRIAGE NOTES
To room via ambulation. Pt c/o bilateral eye redness, itchiness and slight swelling intermittent with last flare up x 5 days. Pt states initial flare up started 9/2024. Pt denies wearing contacts

## 2025-01-20 ENCOUNTER — HOSPITAL ENCOUNTER (OUTPATIENT)
Dept: PEDIATRICS | Age: 20
Discharge: HOME | End: 2025-01-20
Payer: MEDICAID

## 2025-01-20 VITALS
OXYGEN SATURATION: 97 % | TEMPERATURE: 97.2 F | SYSTOLIC BLOOD PRESSURE: 110 MMHG | HEART RATE: 84 BPM | DIASTOLIC BLOOD PRESSURE: 63 MMHG | WEIGHT: 119.2 LBS | HEIGHT: 60 IN | RESPIRATION RATE: 16 BRPM | BODY MASS INDEX: 23.4 KG/M2

## 2025-01-20 DIAGNOSIS — Q21.10 ATRIAL SEPTAL DEFECT: Primary | ICD-10-CM

## 2025-01-20 DIAGNOSIS — Z87.74 H/O ATRIAL SEPTAL DEFECT REPAIR: ICD-10-CM

## 2025-01-20 LAB
EKG ATRIAL RATE: 76 BPM
EKG P AXIS: 60 DEGREES
EKG P-R INTERVAL: 164 MS
EKG Q-T INTERVAL: 386 MS
EKG QRS DURATION: 86 MS
EKG QTC CALCULATION (BAZETT): 434 MS
EKG R AXIS: 154 DEGREES
EKG T AXIS: 77 DEGREES
EKG VENTRICULAR RATE: 76 BPM

## 2025-01-20 PROCEDURE — 99212 OFFICE O/P EST SF 10 MIN: CPT

## 2025-01-20 PROCEDURE — 93005 ELECTROCARDIOGRAM TRACING: CPT | Performed by: PEDIATRICS

## 2025-01-20 NOTE — PROGRESS NOTES
Department of Pediatrics  Outpatient cardiology clinic at [facility]           CHIEF COMPLAINT:  ASD follow up and atrial tachycardia     History Obtained From:  patient, mother     HISTORY OF PRESENT ILLNESS:             Isabella is a 19 y.o. female with history of Multiple hereditary osteochondromas, s/p multiple bone tumor resections, who presents today for follow up evaluation of large atrial septal defect now s/p surgical repair and SVT well controlled on Metoprolol.      Isabella was first evaluated in cardiology clinic in Oct 2021, at which time she reported palpitations that occurred randomly and lasted for about a minute.  During that visit she was noted to have a markedly abnormal EKG, so an echocardiogram was completed and showed an aneurysmal atrial septum and concern for significant right heart dilation.  The atrial septum was not well visualized, but there appeared to be an ASD.  We asked for Isabella to obtain an cMRI  and EST.  Her EST was unremarkable, but MRI confirmed a large ASD with 2.8:1 Qp: Qs.  Repeat echocardiogram showed a large atrial septal defect with deficient pulmonary venous and aortic rims. Holter monitor also revealed SVT.       Isabella initially went for an attempt at ablation, which was unsuccessful.  She was subsequently maintained on Metrolol XL.  She underwent successful surgical ASD closure with Dr. Gonzalez at [facility] in September 2022.  There were no aki or post operative concerns.  After her right heart chamber sizes returned to normal, we discontinued the Metoprolol as we were optimistic that upon normalization of right atrial size, the arrhythmias may resolved. However, this was not the case.  She was noted on Zio to have ongoing episodes of SVT.  She was restarted on the Metoprolol  50mg XL.  She notes that since restarting the Metoprolol, her symptoms have improved. She still occasionally has a funny or skipped beat, but she no longer feels sick or unwell with the episodes.  She does

## 2025-01-20 NOTE — DISCHARGE INSTRUCTIONS
Isabella was seen today in cardiology clinic for follow up of her large atrial septal defect s/p surgical repair and SVT, which was previously well controlled on Metoprolol. Last echocardiogram from July 2024 looked great-- no residual ASD, normal right heart chamber sizes, pressures and function. No concerns.  We discussed the possibility of increasing the Metoprolol, but given concerns for frequent fatigue and feeling that symptoms have improved on the Metoprolol 50mg XL,we will continue this dose for now.  However, if symptoms worsen, or desire to try increased dose, please call.  We will plan for a follow up in 1 year with our ACHD (adult congenital heart disease) team at our [facility] location. No cardiac restrictions or precautions are needed at this time. Please do not hesitate to call with any questions or concerns.

## 2025-02-16 NOTE — PROGRESS NOTES
[facility]  [address]  [facility] [address]  Dept: [phone]  Dept Fax: [phone]    SUBJECTIVE     Isabella Galeano is a 19 y.o.female with history ASD, extoses, palpitations    Pt presents for followup of a rash that she first noticed a few weeks ago and she attributes this to starting on keto diet. Was given prednisone in [facility] Ed which helped with rash on abdomen and back but rash on scalp didn't go away and developed swollen lymph node on neck so went to ED on Tuesday -- ED provider noted some yellow crusting on the scalp rash - was treated with keflex for possible impetigo. She denies recent cat scratch, bug bite, tic bite. She described the rash as red bumps on her abdomen/back but it has mostly resolved.    Tested for flu, strep, COVID and mono that was all negative. Had US lymph nodes completed on Friday that showed reactive LAD.    Has appt with Dr Naranjo on Wednesday              Had elevated glucose in the ER  Some shakiness that resolves with eating but no increased thirst or urination.      Wt Readings from Last 3 Encounters:   02/17/25 53.3 kg (117 lb 6.4 oz)   01/20/25 54.1 kg (119 lb 3.2 oz)   11/12/24 54.4 kg (120 lb)     Patient Active Problem List   Diagnosis    Multiple hereditary exostoses    Atrial septal defect    Multiple hereditary osteochondromas       Current Outpatient Medications   Medication Sig Dispense Refill    cephALEXin (KEFLEX) 500 MG capsule Take by mouth      metoprolol succinate (TOPROL XL) 50 MG extended release tablet Take 1 tablet by mouth daily      fluticasone (FLONASE) 50 MCG/ACT nasal spray 1 spray by Each Nostril route in the morning and at bedtime 16 g 11    ibuprofen (ADVIL;MOTRIN) 200 MG tablet Take 1-2 tablets by mouth as needed for Pain      Aspirin-Acetaminophen-Caffeine (EXCEDRIN EXTRA STRENGTH PO) Take by mouth as needed       No current facility-administered medications for this visit.       Review of

## 2025-02-17 ENCOUNTER — HOSPITAL ENCOUNTER (OUTPATIENT)
Age: 20
Discharge: HOME | End: 2025-02-17
Payer: MEDICAID

## 2025-02-17 ENCOUNTER — OFFICE VISIT (OUTPATIENT)
Dept: FAMILY MEDICINE CLINIC | Age: 20
End: 2025-02-17
Payer: MEDICAID

## 2025-02-17 VITALS
BODY MASS INDEX: 23.05 KG/M2 | RESPIRATION RATE: 12 BRPM | HEIGHT: 60 IN | HEART RATE: 94 BPM | SYSTOLIC BLOOD PRESSURE: 120 MMHG | WEIGHT: 117.4 LBS | OXYGEN SATURATION: 98 % | TEMPERATURE: 97.8 F | DIASTOLIC BLOOD PRESSURE: 74 MMHG

## 2025-02-17 DIAGNOSIS — R73.09 ELEVATED GLUCOSE LEVEL: ICD-10-CM

## 2025-02-17 DIAGNOSIS — R59.1 LYMPHADENOPATHY OF HEAD AND NECK: Primary | ICD-10-CM

## 2025-02-17 DIAGNOSIS — R21 RASH: ICD-10-CM

## 2025-02-17 LAB
DEPRECATED MEAN GLUCOSE BLD GHB EST-ACNC: 90 MG/DL (ref 70–126)
HBA1C MFR BLD HPLC: 5 % (ref 4.4–6.4)

## 2025-02-17 PROCEDURE — 36415 COLL VENOUS BLD VENIPUNCTURE: CPT

## 2025-02-17 PROCEDURE — 83036 HEMOGLOBIN GLYCOSYLATED A1C: CPT

## 2025-02-17 PROCEDURE — 99213 OFFICE O/P EST LOW 20 MIN: CPT

## 2025-02-17 RX ORDER — CEPHALEXIN 500 MG/1
CAPSULE ORAL
COMMUNITY
Start: 2025-02-11

## 2025-02-17 SDOH — ECONOMIC STABILITY: FOOD INSECURITY: WITHIN THE PAST 12 MONTHS, YOU WORRIED THAT YOUR FOOD WOULD RUN OUT BEFORE YOU GOT MONEY TO BUY MORE.: NEVER TRUE

## 2025-02-17 SDOH — ECONOMIC STABILITY: FOOD INSECURITY: WITHIN THE PAST 12 MONTHS, THE FOOD YOU BOUGHT JUST DIDN'T LAST AND YOU DIDN'T HAVE MONEY TO GET MORE.: NEVER TRUE

## 2025-02-17 ASSESSMENT — PATIENT HEALTH QUESTIONNAIRE - PHQ9
SUM OF ALL RESPONSES TO PHQ QUESTIONS 1-9: 1
SUM OF ALL RESPONSES TO PHQ QUESTIONS 1-9: 1
1. LITTLE INTEREST OR PLEASURE IN DOING THINGS: NOT AT ALL
2. FEELING DOWN, DEPRESSED OR HOPELESS: SEVERAL DAYS
SUM OF ALL RESPONSES TO PHQ9 QUESTIONS 1 & 2: 1
SUM OF ALL RESPONSES TO PHQ QUESTIONS 1-9: 1
SUM OF ALL RESPONSES TO PHQ QUESTIONS 1-9: 1

## 2025-02-17 ASSESSMENT — ENCOUNTER SYMPTOMS
ABDOMINAL DISTENTION: 0
BLOOD IN STOOL: 0
SORE THROAT: 0
ABDOMINAL PAIN: 0
RHINORRHEA: 0
PHOTOPHOBIA: 0
WHEEZING: 0
DIARRHEA: 0
NAUSEA: 0
SHORTNESS OF BREATH: 0
CONSTIPATION: 0
VOMITING: 0

## 2025-02-17 NOTE — PROGRESS NOTES
S: 19 y.o. female with   Chief Complaint   Patient presents with    ED Follow-up     Rash and Swollen Lymph nodes       HPI: please see resident note for HPI and ROS.    BP Readings from Last 3 Encounters:   02/17/25 120/74   01/20/25 110/63   11/12/24 96/64     Wt Readings from Last 3 Encounters:   02/17/25 53.3 kg (117 lb 6.4 oz)   01/20/25 54.1 kg (119 lb 3.2 oz)   11/12/24 54.4 kg (120 lb)       O: VS:  height is 1.528 m (5' 0.16\") and weight is 53.3 kg (117 lb 6.4 oz). Her oral temperature is 97.8 °F (36.6 °C). Her blood pressure is 120/74 and her pulse is 94. Her respiration is 12 and oxygen saturation is 98%.   Physical exam performed by resident physician     Diagnosis Orders   1. Lymphadenopathy of head and neck        2. Rash        3. Elevated glucose level  Hemoglobin A1C          Plan:  Please refer to resident note for full plan.    19 year old female presents to the office for er follow up for rash. Patient had rash on scalp that was worsening with an enlarged lymph node on neck. Was given prednisone in er, helped with rash on body, but rash on scalp no better. Had ultrasound of her lymph nodes, reactive in nature. Rash of scalp improved with keflex possible impetigo. Follow up if lymphadenopathy continues for more than 6-8 weeks for repeat testing. Plan to follow up with dermatology as scheduled.   -requesting diabetic screening.     Health Maintenance Due   Topic Date Due    Varicella vaccine (2 of 2 - 2-dose childhood series) 11/19/2008    HPV vaccine (1 - 3-dose series) Never done    Flu vaccine (1) Never done    COVID-19 Vaccine (1 - 2024-25 season) Never done    Chlamydia/GC screen  10/23/2024       Attending Physician Statement  I have discussed the case, including pertinent history and exam findings with the resident.  I agree with the documented assessment and plan as documented by the resident.    CONRAD Edouard Jr., DO 2/20/2025 7:16 AM

## 2025-02-17 NOTE — PATIENT INSTRUCTIONS
Let me know if rash comes back before follow up    Get A1c done when able    If lymph nodes not resolved in 6 weeks, we will consider further workup

## 2025-02-18 ENCOUNTER — TELEPHONE (OUTPATIENT)
Dept: FAMILY MEDICINE CLINIC | Age: 20
End: 2025-02-18

## 2025-02-18 NOTE — TELEPHONE ENCOUNTER
----- Message from Dr. Nayana Hatch DO sent at 2/17/2025  8:47 PM EST -----  A1c normal at 5%. No evidence diabetes

## 2025-03-24 ENCOUNTER — OFFICE VISIT (OUTPATIENT)
Dept: FAMILY MEDICINE CLINIC | Age: 20
End: 2025-03-24

## 2025-03-24 VITALS
RESPIRATION RATE: 14 BRPM | DIASTOLIC BLOOD PRESSURE: 68 MMHG | TEMPERATURE: 98.7 F | WEIGHT: 120.4 LBS | BODY MASS INDEX: 23.64 KG/M2 | HEIGHT: 60 IN | SYSTOLIC BLOOD PRESSURE: 104 MMHG | HEART RATE: 75 BPM | OXYGEN SATURATION: 98 %

## 2025-03-24 DIAGNOSIS — H92.02 EAR DISCOMFORT, LEFT: ICD-10-CM

## 2025-03-24 DIAGNOSIS — J30.9 ALLERGIC RHINITIS, UNSPECIFIED SEASONALITY, UNSPECIFIED TRIGGER: Primary | ICD-10-CM

## 2025-03-24 DIAGNOSIS — R21 RASH: ICD-10-CM

## 2025-03-24 RX ORDER — HYDROCODONE BITARTRATE AND ACETAMINOPHEN 5; 325 MG/1; MG/1
TABLET ORAL
COMMUNITY
Start: 2025-03-12 | End: 2025-03-24 | Stop reason: ALTCHOICE

## 2025-03-24 RX ORDER — FLUTICASONE PROPIONATE 50 MCG
1 SPRAY, SUSPENSION (ML) NASAL 2 TIMES DAILY
Qty: 16 G | Refills: 11 | Status: SHIPPED | OUTPATIENT
Start: 2025-03-24

## 2025-03-24 RX ORDER — ACETAMINOPHEN 325 MG/1
TABLET ORAL
COMMUNITY
Start: 2025-03-12

## 2025-03-24 RX ORDER — KETOCONAZOLE 20 MG/ML
SHAMPOO, SUSPENSION TOPICAL
COMMUNITY
Start: 2025-02-20

## 2025-03-24 RX ORDER — PREDNISONE 20 MG/1
TABLET ORAL
COMMUNITY
Start: 2025-01-29 | End: 2025-03-24 | Stop reason: ALTCHOICE

## 2025-03-24 NOTE — PROGRESS NOTES
S: 20 y.o. female with   Chief Complaint   Patient presents with    Follow-up     Patient in office today for 6 week follow up. Patient states things have been going pretty well, stated she feels her lymph nodes are back to normal. States that she would like to discuss here ears, stated that within the past couple of months they have been \"sensitive\"        HPI: please see resident note for HPI and ROS.    BP Readings from Last 3 Encounters:   03/24/25 104/68   02/17/25 120/74   01/20/25 110/63     Wt Readings from Last 3 Encounters:   03/24/25 54.6 kg (120 lb 6.4 oz)   02/17/25 53.3 kg (117 lb 6.4 oz)   01/20/25 54.1 kg (119 lb 3.2 oz)       O: VS:  height is 1.528 m (5' 0.16\") and weight is 54.6 kg (120 lb 6.4 oz). Her oral temperature is 98.7 °F (37.1 °C). Her blood pressure is 104/68 and her pulse is 75. Her respiration is 14 and oxygen saturation is 98%.   Physical exam performed by resident physician     Diagnosis Orders   1. Allergic rhinitis, unspecified seasonality, unspecified trigger  fluticasone (FLONASE) 50 MCG/ACT nasal spray      2. Ear discomfort, left  [facility]      3. Rash            Plan:  Please refer to resident note for full plan.    19 year old female presents to the office to follow up for lymphadenopathy with rash. There was concern for impetigo treated with keflex and ketoconazole shampoo and improved. Lymph nodes are back to baseline, no concerns at this time. Patient also has concerns about ear sensitivity. Feels like there is a fluttering in her ears especially with loud noises. Concerns for possible noise induced hearing loss vs otitis effusion. Consider audiology evaluation for hearing testing, continue flonase.     Health Maintenance Due   Topic Date Due    Varicella vaccine (2 of 2 - 2-dose childhood series) 11/19/2008    HPV vaccine (1 - 3-dose series) Never done    Meningococcal B vaccine (1 of 2 - Standard) Never done    COVID-19 Vaccine (1 - 2024-25 season) 
and Affect: Mood normal.         Thought Content: Thought content normal.     Labs & Imaging   Most Recent Labs:  Lab Results   Component Value Date    WBC 7.5 2025    HGB 15.3 (H) 2025    HCT 44.4 (H) 2025     2025    CHOL 167 10/23/2023    TRIG 60 10/23/2023    HDL 56 10/23/2023    ALT 10 (L) 10/23/2023    AST 15 10/23/2023     2025     2025    K 4.1 2025    CREATININE 0.73 2025    BUN 15 2025    CO2 26 2025    TSH 1.550 07/15/2024    LABA1C 5.0 2025     Most Recent Imaging:  US HEAD NECK SOFT TISSUE  Ordering Provider: Gina Rodriguez  [facility]        Radiology Department  Patient:  KADY MARQUEZ    [address]  :  2005   Sex:  HEIDI Navarro [place] [address]  Location:  [facility]    [phone]   Unit #:   [id number]      Acct #:  [id number]      Ordering Phys: Gina Rodriguez PA-C          Exam Date: 25    Accession #:  [id number]    Exam:  US   US Neck Soft Tissues    Result: See Report          EXAM:    US SOFT TISSUES OF THE NECK        CLINICAL INDICATION:    CERVICAL ADENOPATHY        TECHNIQUE:    Real-time ultrasound scan of the soft tissues of the neck with image documentation.        COMPARISON:    No relevant prior studies available.        FINDINGS:        SOFT TISSUES:  Unremarkable.  No abscess.  No foreign body.        LYMPH NODES:  Small bilateral cervical lymph nodes are identified with preserved reniform   shape and central echogenic hilum. The lymph nodes measure up to 6 mm in region of   palpable lump (right posterior neck).        IMPRESSION:             Nonspecific reactive appearing lymph nodes including in the region of palpable   abnormality. No dominant ayana mass/adenopathy.                cc:  Pinky Thao MD; Gina Rodriguez PA-C            Dictated by:  KIRSTEN Haney MD on 25 0817    Transcribed by:  KIRSTEN Haney on 25 0819        Report

## 2025-03-28 ENCOUNTER — HOSPITAL ENCOUNTER (OUTPATIENT)
Dept: AUDIOLOGY | Age: 20
Discharge: HOME | End: 2025-03-28
Payer: MEDICAID

## 2025-03-28 PROCEDURE — 92567 TYMPANOMETRY: CPT | Performed by: AUDIOLOGIST

## 2025-03-28 PROCEDURE — 92557 COMPREHENSIVE HEARING TEST: CPT | Performed by: AUDIOLOGIST

## 2025-03-28 NOTE — PROGRESS NOTES
AUDIOLOGICAL EVALUATION      REASON FOR TESTING:  Audiometric evaluation per the request of Kiko Khalil MD, due to the diagnosis of left ear discomfort. The patient reports frequent \"fluttering\" in the left ear, often in response to loud noise. She reports her symptoms started approximately two months ago. She also notes recent illness including a facial rash and enlarged lymph nodes in her neck around the same time as left ear symptoms but is unsure which occurred first. She denies any otalgia, otorrhea, aural fullness, history of ear infections or previous ear surgery. The patient denies any history of TMJ disorder or bruxism. Health history significant for atrial septal defect and hereditary osteochondromas.     OTOSCOPY: Clear external ear canals, tympanic membranes visible/WNL, bilaterally.     AUDIOGRAM        Reliability: Good    COMMENTS: Pure tone audiogram indicates normal hearing sensitivity, bilaterally. Speech reception thresholds agree with pure tone findings, bilaterally. Word recognition scores are excellent, bilaterally, with speech presented at soft conversational levels in quiet. Tympanometry indicates normal ear canal volume, peak pressure and compliance, bilaterally.       RECOMMENDATION(S):   Follow up with referring provider as planned.  Consider ENT referral due to unilateral left ear symptoms, per discretion of provider.  Repeat audiologic testing with any significant changes in hearing sensitivity or tinnitus.  Hearing protection in loud noise.

## 2025-05-30 NOTE — TELEPHONE ENCOUNTER
Mother Marshal Ford, on [employer], called to check results of COVID-19 and since they were negative, pt has no symptoms, never did, she wanted to cancel her appt for 11/10/2020. Thanks.
No

## 2025-07-17 ENCOUNTER — LAB (OUTPATIENT)
Dept: LAB | Age: 20
End: 2025-07-17

## 2025-08-03 LAB — CYTOLOGY THIN PREP PAP: NORMAL
